# Patient Record
(demographics unavailable — no encounter records)

---

## 2017-02-26 NOTE — EMERGENCY DEPARTMENT REPORT
Upper Extremity





- HPI


Chief Complaint: Extremity Injury, Upper


Stated Complaint: PAIN IN LEFT ARM


Time Seen by Provider: 02/26/17 13:36


Upper Extremity: Left Elbow (mild left elbow swelling, forearm pronation/

supination intact but somerhwat painful, rom flexion/extension left elbow intact

)


Occurred When: 5 Days


Severity: moderate


Symptoms: Yes Pain with Movement, Yes Deformity, Yes Swelling, No Bruising/

Ecchymosis, No Laceration or Abrasion


Other History: 64-year-old male past medical history diabetes, gout presents 

with 4-5 days of left elbow swelling and pain.  Patient denies any fever or 

chills denies any direct trauma.  Patient states he is able to range left elbow 

but difficult due to pain.  Dates this feels like prior episodes of gouty 

arthritis





ED Review of Systems


ROS: 


Stated complaint: PAIN IN LEFT ARM


Other details as noted in HPI





Constitutional: denies: chills, fever


Eyes: denies: eye pain, eye discharge, vision change


ENT: denies: ear pain, throat pain


Respiratory: denies: cough, shortness of breath, wheezing


Cardiovascular: denies: chest pain, palpitations


Endocrine: no symptoms reported


Gastrointestinal: denies: abdominal pain, nausea, diarrhea


Genitourinary: denies: urgency, dysuria


Musculoskeletal: as per HPI, joint swelling.  denies: back pain, arthralgia


Skin: denies: rash, lesions


Neurological: denies: headache, weakness, paresthesias


Psychiatric: denies: anxiety, depression


Hematological/Lymphatic: denies: easy bleeding, easy bruising





ED Past Medical Hx





- Past Medical History


Hx Hypertension: Yes


Hx Congestive Heart Failure: Yes


Hx Diabetes:  (PREDIABETIC)


Hx of Cancer: Yes (PROSTATE)


Hx Arthritis: Yes


Additional medical history: GOUT





- Surgical History


Additional Surgical History: PROSTATE SEED IMPLANT





- Social History


Smoking Status: Former Smoker


Substance Use Type: Alcohol





- Medications


Home Medications: 


 Home Medications











 Medication  Instructions  Recorded  Confirmed  Last Taken  Type


 


HYDROcodone/APAP 5-325 [[place] 1 each PO Q6HR PRN #15 tablet 02/26/17  Unknown Rx





5/325]     


 


Ibuprofen [Motrin] 600 mg PO Q8H PRN #25 tablet 02/26/17  Unknown Rx


 


Prednisone [predniSONE 10 mg 10 mg PO .TAPER #1 tab.ds.pk 02/26/17  Unknown Rx





(6-Day Pack, 21 Tabs)]     














Upper Extremity Exam





- Exam


General: 


Vital signs noted. No distress. Alert and acting appropriately.





Head and Torso: No HEENT Abnormality, No Neck Tenderness, No Chest/Lungs 

Abnormality, No Abdominal Tenderness, No Back Tenderness


Shoulder Exam: Yes Normal Range of Motion in Shoulder, No Shoulder Tenderness, 

No Clavicle Tenderness, No Shoulder Deformity, No AC Joint Tenderness


Arm Exam: No Arm/Humerus Tenderness, No Arm Deformity


Elbow: Yes Elbow Tenderness, Yes Normal Range of Motion in Elbow (

flexionextension somehwat painful but intact active and passive), Yes Elbow 

Deformity (mild left elbow swelling)


Forearm: No Forearm Tenderness, No Forearm Deformity, No Pain with Pronation, 

No Pain with Supination


Wrist: Yes Normal ROM in Wrist, No Wrist Tenderness, No Wrist Deformity, No 

Snuffbox Tenderness, No Pain with Axial Thumb Compression


Hand: Yes Normal ROM in Digit(s), No Hand Tenderness, No Hand Deformity, No 

Digit Tenderness, No Digit(s) Deformity, No Tendon Dysfunction


CMS Exam: Yes Normal Distal Pulses, Yes Normal Capillary Refill, Yes Normal 

Distal Sensation, No Broken Skin


Front/Back of Body, Lg (Color): 


  __________________________














  __________________________





 1 - area of swelling, no erythema








ED Course


 Vital Signs











  02/26/17





  12:27


 


Temperature 97.5 F L


 


Pulse Rate 90


 


Respiratory 19





Rate 


 


Blood Pressure 146/83


 


O2 Sat by Pulse 96





Oximetry 














ED Medical Decision Making





- Lab Data


Result diagrams: 


 02/26/17 14:11





 02/26/17 14:11





- Medical Decision Making


A/P: Left elbow gouty arthritis


1-I discussed case with Dr. Horn, reviewed labwork and xray report with Dr. Horn. As pt has hx of recurrent gouty arthritis of left elbow and symptoms 

consistent with gout will treat as gouty arthritis for now and give pt script 

precautions to return to ED for any fever, chills, inability to actively or 

passively range left elbow joint, rom intact now on exam. I gave pt specific 

advice to return for these symptoms, pt expressed understanding of these 

instructions.


2- motrin, norco and prednisone taper pack for pain. will give pt elbow sling. 

RICE therapy


3- f/u with PMD and orthopedics





Critical care attestation.: 


If time is entered above; I have spent that time in minutes in the direct care 

of this critically ill patient, excluding procedure time.








ED Disposition


Clinical Impression: 


 Elbow pain, left





Disposition: DISCHARGED TO HOME OR SELFCARE


Is pt being admited?: No


Does the pt Need Aspirin: No


Condition: Stable


Instructions:  Acute Gouty Arthritis (ED), RICE Therapy (ED)


Prescriptions: 


HYDROcodone/APAP 5-325 [[place] 5/325] 1 each PO Q6HR PRN #15 tablet


 PRN Reason: Pain


Ibuprofen [Motrin] 600 mg PO Q8H PRN #25 tablet


 PRN Reason: Pain


Prednisone [predniSONE 10 mg (6-Day Pack, 21 Tabs)] 10 mg PO .TAPER #1 tab.ds.pk


Referrals: 


BENNIE CHANEY MD [Staff Physician] - 3-5 Days


LETITIA SKY MD [Staff Physician] - 3-5 Days


Forms:  Work/School Release Form(ED)


Time of Disposition: 15:16

## 2017-02-26 NOTE — XRAY REPORT
FINAL REPORT



PROCEDURE:  XR ELBOW 2V LT



TECHNIQUE:  Three views left elbow



HISTORY:  elbow swelling hx of gout 



COMPARISON:  No prior studies are available for comparison.



FINDINGS:  

Soft tissue swelling left elbow. Abnormal anterior fat pad sign

and prominently abnormal posterior fat pad sign. Severe

degenerative changes of the elbow joint. There is patchy

ill-defined calcification ventral aspect of the elbow joint in

the 0.5 by 1.5 centimeter range. Calcification posteriorly

superimposed over the posterior fat pad sign lateral view

measures 0.5 x 2.0 centimeters. Endplate sclerotic change at the

olecranon surface. Some volume loss of the radial head with

evidence for fracturing of indeterminate age. If recent trauma

has occurred acute fracture may be present. In the absence of

trauma manifestations of gout and other disease processes should

be considered. Followup CT scan with and without IV contrast and

or MRI may be of use. Septic arthritis of the left elbow joint

not excludable.



IMPRESSION:  

Left elbow swelling with degenerative changes with para osseous

calcifications as above



Suspect manifestations of chronic fracture 



Followup is advised